# Patient Record
Sex: FEMALE | Race: BLACK OR AFRICAN AMERICAN | NOT HISPANIC OR LATINO
[De-identification: names, ages, dates, MRNs, and addresses within clinical notes are randomized per-mention and may not be internally consistent; named-entity substitution may affect disease eponyms.]

---

## 2017-03-21 ENCOUNTER — APPOINTMENT (OUTPATIENT)
Dept: SURGERY | Facility: CLINIC | Age: 72
End: 2017-03-21

## 2017-04-18 ENCOUNTER — RESULT REVIEW (OUTPATIENT)
Age: 72
End: 2017-04-18

## 2017-09-28 ENCOUNTER — APPOINTMENT (OUTPATIENT)
Dept: SURGERY | Facility: CLINIC | Age: 72
End: 2017-09-28
Payer: MEDICARE

## 2017-09-28 DIAGNOSIS — K11.9 DISEASE OF SALIVARY GLAND, UNSPECIFIED: ICD-10-CM

## 2017-09-28 PROCEDURE — 36415 COLL VENOUS BLD VENIPUNCTURE: CPT

## 2017-09-28 PROCEDURE — 99213 OFFICE O/P EST LOW 20 MIN: CPT | Mod: 25

## 2017-09-29 LAB
T3 SERPL-MCNC: 83 NG/DL
T4 FREE SERPL-MCNC: 0.9 NG/DL
THYROGLOB AB SERPL-ACNC: <20 IU/ML
THYROPEROXIDASE AB SERPL IA-ACNC: <10 IU/ML
TSH SERPL-ACNC: 1.7 UIU/ML

## 2017-10-16 ENCOUNTER — OTHER (OUTPATIENT)
Age: 72
End: 2017-10-16

## 2018-03-29 ENCOUNTER — APPOINTMENT (OUTPATIENT)
Dept: SURGERY | Facility: CLINIC | Age: 73
End: 2018-03-29
Payer: MEDICARE

## 2018-03-29 PROCEDURE — 99213 OFFICE O/P EST LOW 20 MIN: CPT

## 2018-10-04 ENCOUNTER — APPOINTMENT (OUTPATIENT)
Dept: SURGERY | Facility: CLINIC | Age: 73
End: 2018-10-04
Payer: MEDICARE

## 2018-10-04 PROCEDURE — 36415 COLL VENOUS BLD VENIPUNCTURE: CPT

## 2018-10-04 PROCEDURE — 99213 OFFICE O/P EST LOW 20 MIN: CPT

## 2018-10-05 LAB
T3 SERPL-MCNC: 78 NG/DL
T4 FREE SERPL-MCNC: 1.1 NG/DL
TSH SERPL-ACNC: 1.84 UIU/ML

## 2018-10-06 LAB
THYROGLOB AB SERPL-ACNC: <20 IU/ML
THYROPEROXIDASE AB SERPL IA-ACNC: <10 IU/ML

## 2018-10-18 ENCOUNTER — OTHER (OUTPATIENT)
Age: 73
End: 2018-10-18

## 2019-04-09 ENCOUNTER — APPOINTMENT (OUTPATIENT)
Dept: SURGERY | Facility: CLINIC | Age: 74
End: 2019-04-09
Payer: MEDICARE

## 2019-04-09 PROCEDURE — 99213 OFFICE O/P EST LOW 20 MIN: CPT

## 2019-04-09 NOTE — HISTORY OF PRESENT ILLNESS
[de-identified] : prior evaluation of thyroid nodule, cytologically benign.  denies dysphagia, hoarseness.  no changes medically since last visit.  last sonogram and TFT's stable

## 2019-04-09 NOTE — PHYSICAL EXAM
[de-identified] : no change in 5 cm right thyroid nodule, well circumscribed and mobile with slight substernal extension [Laryngoscopy Performed] : laryngoscopy was performed, see procedure section for findings [L] : deviated to the left [Normal] : cranial nerves 2-12 intact [de-identified] : mild left submandibular swelling

## 2019-10-10 ENCOUNTER — APPOINTMENT (OUTPATIENT)
Dept: SURGERY | Facility: CLINIC | Age: 74
End: 2019-10-10
Payer: MEDICARE

## 2019-10-10 PROCEDURE — 31575 DIAGNOSTIC LARYNGOSCOPY: CPT

## 2019-10-10 PROCEDURE — 99214 OFFICE O/P EST MOD 30 MIN: CPT | Mod: 25

## 2019-10-10 NOTE — PHYSICAL EXAM
[Laryngoscopy Performed] : laryngoscopy was performed, see procedure section for findings [L] : deviated to the left [Normal] : orientation to person, place, and time: normal [de-identified] : no change in 5 cm right thyroid nodule, well circumscribed and mobile with slight substernal extension

## 2019-10-10 NOTE — HISTORY OF PRESENT ILLNESS
[de-identified] : prior evaluation of thyroid nodule, cytologically benign.  denies dysphagia, hoarseness.  no changes medically since last visit.  recent TFT's stable. sonogram needs to be compared to prior study - spoke with dr dickerson who will issue addendum.   pt thinks SOB may be worse.  seen by endocrinologist at [place] who recommended considering surgery

## 2019-10-10 NOTE — ASSESSMENT
[FreeTextEntry1] : will review films and then decide if any change which necessitates surgical intervention.  to call next week for results.  risks, benefits and alternatives discussed at length.  I have discussed with the patient the anatomy of the area, the pathophysiology of the disease process and the rationale for surgery.  The attendant risks, possible complications and expected postoperative course have been discussed in detail.  I have given the patient the opportunity to ask questions, and all questions have been answered to the patient's satisfaction.\par

## 2019-10-14 ENCOUNTER — OTHER (OUTPATIENT)
Age: 74
End: 2019-10-14

## 2020-09-24 ENCOUNTER — APPOINTMENT (OUTPATIENT)
Dept: SURGERY | Facility: CLINIC | Age: 75
End: 2020-09-24
Payer: MEDICARE

## 2020-09-24 PROCEDURE — 99213 OFFICE O/P EST LOW 20 MIN: CPT

## 2020-09-24 PROCEDURE — 36415 COLL VENOUS BLD VENIPUNCTURE: CPT

## 2020-09-24 NOTE — HISTORY OF PRESENT ILLNESS
[de-identified] : prior evaluation of thyroid nodule, cytologically benign.  denies dysphagia, hoarseness.  no changes medically since last visit.

## 2020-09-24 NOTE — PHYSICAL EXAM
[de-identified] : no change in 5 cm right thyroid nodule, well circumscribed and mobile with slight substernal extension [Laryngoscopy Performed] : laryngoscopy was performed, see procedure section for findings [L] : deviated to the left [Normal] : orientation to person, place, and time: normal

## 2020-09-24 NOTE — ASSESSMENT
[FreeTextEntry1] : will  observe. bloods drawn. sonogram requested. to call next week for results. to return earlier if any change

## 2020-09-25 LAB
T3 SERPL-MCNC: 85 NG/DL
T4 FREE SERPL-MCNC: 0.9 NG/DL
TSH SERPL-ACNC: 1.79 UIU/ML

## 2020-09-29 LAB
THYROGLOB AB SERPL-ACNC: <20 IU/ML
THYROPEROXIDASE AB SERPL IA-ACNC: <10 IU/ML

## 2020-10-26 ENCOUNTER — NON-APPOINTMENT (OUTPATIENT)
Age: 75
End: 2020-10-26

## 2021-05-03 ENCOUNTER — APPOINTMENT (OUTPATIENT)
Dept: ULTRASOUND IMAGING | Facility: IMAGING CENTER | Age: 76
End: 2021-05-03
Payer: MEDICARE

## 2021-05-03 ENCOUNTER — RESULT REVIEW (OUTPATIENT)
Age: 76
End: 2021-05-03

## 2021-05-03 ENCOUNTER — OUTPATIENT (OUTPATIENT)
Dept: OUTPATIENT SERVICES | Facility: HOSPITAL | Age: 76
LOS: 1 days | End: 2021-05-03
Payer: MEDICARE

## 2021-05-03 DIAGNOSIS — E04.0 NONTOXIC DIFFUSE GOITER: ICD-10-CM

## 2021-05-03 PROCEDURE — 76536 US EXAM OF HEAD AND NECK: CPT | Mod: 26

## 2021-05-03 PROCEDURE — 76536 US EXAM OF HEAD AND NECK: CPT

## 2021-05-13 ENCOUNTER — APPOINTMENT (OUTPATIENT)
Dept: SURGERY | Facility: CLINIC | Age: 76
End: 2021-05-13
Payer: MEDICARE

## 2021-05-13 PROCEDURE — 99214 OFFICE O/P EST MOD 30 MIN: CPT | Mod: 25

## 2021-05-13 PROCEDURE — 99072 ADDL SUPL MATRL&STAF TM PHE: CPT

## 2021-05-13 PROCEDURE — 36415 COLL VENOUS BLD VENIPUNCTURE: CPT

## 2021-05-13 NOTE — PHYSICAL EXAM
[de-identified] : no change in 5 cm right thyroid nodule, well circumscribed and mobile with slight substernal extension [Laryngoscopy Performed] : laryngoscopy was performed, see procedure section for findings [L] : deviated to the left [Normal] : orientation to person, place, and time: normal

## 2021-05-13 NOTE — ASSESSMENT
[FreeTextEntry1] : will  observe. bloods drawn. sonogram next visit. to call next week for results. to return earlier if any change.  discussed may need to consider surgery if continues to enlarge.  patient has been given the opportunity to ask questions, and all of the patient's questions have been answered to their satisfaction\par

## 2021-05-16 LAB
T3 SERPL-MCNC: 82 NG/DL
T4 FREE SERPL-MCNC: 1 NG/DL
THYROGLOB AB SERPL-ACNC: <20 IU/ML
THYROPEROXIDASE AB SERPL IA-ACNC: <10 IU/ML
TSH SERPL-ACNC: 1.93 UIU/ML

## 2021-05-20 ENCOUNTER — NON-APPOINTMENT (OUTPATIENT)
Age: 76
End: 2021-05-20

## 2021-11-11 ENCOUNTER — APPOINTMENT (OUTPATIENT)
Dept: SURGERY | Facility: CLINIC | Age: 76
End: 2021-11-11
Payer: MEDICARE

## 2021-11-11 PROCEDURE — 36415 COLL VENOUS BLD VENIPUNCTURE: CPT

## 2021-11-11 PROCEDURE — 99214 OFFICE O/P EST MOD 30 MIN: CPT | Mod: 25

## 2021-11-11 NOTE — ASSESSMENT
[FreeTextEntry1] :  bloods drawn. CT requested to assess tracheal  compression and substernal extension. to call next week for results. to return earlier if any change.  discussed may need to consider surgery if continues to enlarge.  patient has been given the opportunity to ask questions, and all of the patient's questions have been answered to their satisfaction\par

## 2021-11-11 NOTE — HISTORY OF PRESENT ILLNESS
[de-identified] : prior evaluation of thyroid nodule, cytologically benign.  denies dysphagia, hoarseness or SOB.  no changes medically since last visit.   recent sonogram shows continued  enlargement.  I have reviewed all old and new data and available images.\par

## 2021-11-11 NOTE — PHYSICAL EXAM
[de-identified] : 6 cm right thyroid nodule, well circumscribed and mobile with slight substernal extension [Laryngoscopy Performed] : laryngoscopy was performed, see procedure section for findings [L] : deviated to the left [Normal] : orientation to person, place, and time: normal

## 2021-11-12 LAB
24R-OH-CALCIDIOL SERPL-MCNC: 25.4 PG/ML
CALCIUM SERPL-MCNC: 10.8 MG/DL
CALCIUM SERPL-MCNC: 10.8 MG/DL
PARATHYROID HORMONE INTACT: 20 PG/ML
T3 SERPL-MCNC: 87 NG/DL
T4 FREE SERPL-MCNC: 1 NG/DL
TSH SERPL-ACNC: 1.33 UIU/ML

## 2021-11-12 RX ORDER — METHYLPREDNISOLONE 32 MG/1
32 TABLET ORAL
Qty: 2 | Refills: 0 | Status: ACTIVE | COMMUNITY
Start: 2021-11-12 | End: 1900-01-01

## 2021-11-13 ENCOUNTER — APPOINTMENT (OUTPATIENT)
Dept: CT IMAGING | Facility: IMAGING CENTER | Age: 76
End: 2021-11-13
Payer: MEDICARE

## 2021-11-13 ENCOUNTER — OUTPATIENT (OUTPATIENT)
Dept: OUTPATIENT SERVICES | Facility: HOSPITAL | Age: 76
LOS: 1 days | End: 2021-11-13
Payer: MEDICARE

## 2021-11-13 DIAGNOSIS — Z00.8 ENCOUNTER FOR OTHER GENERAL EXAMINATION: ICD-10-CM

## 2021-11-13 LAB
THYROGLOB AB SERPL-ACNC: <20 IU/ML
THYROPEROXIDASE AB SERPL IA-ACNC: <10 IU/ML

## 2021-11-13 PROCEDURE — 70491 CT SOFT TISSUE NECK W/DYE: CPT

## 2021-11-13 PROCEDURE — 70491 CT SOFT TISSUE NECK W/DYE: CPT | Mod: 26

## 2021-11-15 ENCOUNTER — NON-APPOINTMENT (OUTPATIENT)
Age: 76
End: 2021-11-15

## 2021-11-18 ENCOUNTER — NON-APPOINTMENT (OUTPATIENT)
Age: 76
End: 2021-11-18

## 2022-05-19 ENCOUNTER — APPOINTMENT (OUTPATIENT)
Dept: SURGERY | Facility: CLINIC | Age: 77
End: 2022-05-19
Payer: MEDICARE

## 2022-05-19 DIAGNOSIS — Z00.00 ENCOUNTER FOR GENERAL ADULT MEDICAL EXAMINATION W/OUT ABNORMAL FINDINGS: ICD-10-CM

## 2022-05-19 PROCEDURE — 36415 COLL VENOUS BLD VENIPUNCTURE: CPT

## 2022-05-19 PROCEDURE — 99214 OFFICE O/P EST MOD 30 MIN: CPT | Mod: 25

## 2022-05-19 NOTE — PHYSICAL EXAM
[de-identified] : 6 cm right thyroid nodule, well circumscribed and mobile with slight substernal extension [Laryngoscopy Performed] : laryngoscopy was performed, see procedure section for findings [L] : deviated to the left [Normal] : orientation to person, place, and time: normal

## 2022-05-19 NOTE — HISTORY OF PRESENT ILLNESS
[de-identified] : prior evaluation of thyroid nodule, cytologically benign.  denies dysphagia, hoarseness or SOB.  no changes medically since last visit.   recent sonogram shows stable nodule owing for different radiologist.   I have reviewed all old and new data and available images.\par

## 2022-05-20 LAB
24R-OH-CALCIDIOL SERPL-MCNC: 44.2 PG/ML
CALCIUM SERPL-MCNC: 10 MG/DL
CALCIUM SERPL-MCNC: 10 MG/DL
PARATHYROID HORMONE INTACT: 37 PG/ML
T3 SERPL-MCNC: 89 NG/DL
T4 FREE SERPL-MCNC: 0.9 NG/DL
TSH SERPL-ACNC: 1.74 UIU/ML

## 2022-05-22 LAB
THYROGLOB AB SERPL-ACNC: <20 IU/ML
THYROPEROXIDASE AB SERPL IA-ACNC: <10 IU/ML

## 2022-05-31 ENCOUNTER — NON-APPOINTMENT (OUTPATIENT)
Age: 77
End: 2022-05-31

## 2024-04-25 ENCOUNTER — APPOINTMENT (OUTPATIENT)
Dept: SURGERY | Facility: CLINIC | Age: 79
End: 2024-04-25
Payer: MEDICARE

## 2024-04-25 DIAGNOSIS — E04.1 NONTOXIC SINGLE THYROID NODULE: ICD-10-CM

## 2024-04-25 PROCEDURE — 99214 OFFICE O/P EST MOD 30 MIN: CPT

## 2024-04-25 NOTE — ASSESSMENT
[FreeTextEntry1] : will observe.  sonogram next visit.  to return earlier if any change.    patient has been given the opportunity to ask questions, and all of the patient's questions have been answered to their satisfaction

## 2024-04-25 NOTE — PHYSICAL EXAM
[de-identified] : 5 cm right thyroid nodule, well circumscribed and mobile with slight substernal extension [Laryngoscopy Performed] : laryngoscopy was performed, see procedure section for findings [L] : deviated to the left [Normal] : orientation to person, place, and time: normal

## 2024-04-25 NOTE — HISTORY OF PRESENT ILLNESS
[de-identified] : prior evaluation of thyroid nodule, cytologically benign.  denies dysphagia, hoarseness or SOB.   occasional right neck discomfort.  no changes medically since last visit.  recent TFTs normal.  recent sonogram shows stable nodule owing for different radiologist.   I have reviewed all old and new data and available images.

## 2025-05-20 ENCOUNTER — NON-APPOINTMENT (OUTPATIENT)
Age: 80
End: 2025-05-20

## 2025-05-22 ENCOUNTER — APPOINTMENT (OUTPATIENT)
Dept: SURGERY | Facility: CLINIC | Age: 80
End: 2025-05-22
Payer: MEDICARE

## 2025-05-22 DIAGNOSIS — E04.1 NONTOXIC SINGLE THYROID NODULE: ICD-10-CM

## 2025-05-22 PROCEDURE — 99214 OFFICE O/P EST MOD 30 MIN: CPT | Mod: 25

## 2025-05-22 PROCEDURE — 36415 COLL VENOUS BLD VENIPUNCTURE: CPT

## 2025-05-23 LAB
T3 SERPL-MCNC: 82 NG/DL
T4 FREE SERPL-MCNC: 0.8 NG/DL
THYROGLOB AB SERPL-ACNC: 17.9 IU/ML
THYROPEROXIDASE AB SERPL IA-ACNC: 24.3 IU/ML
TSH SERPL-ACNC: 2.31 UIU/ML